# Patient Record
Sex: FEMALE | Race: WHITE | NOT HISPANIC OR LATINO | Employment: FULL TIME | URBAN - METROPOLITAN AREA
[De-identification: names, ages, dates, MRNs, and addresses within clinical notes are randomized per-mention and may not be internally consistent; named-entity substitution may affect disease eponyms.]

---

## 2020-05-05 ENCOUNTER — HOSPITAL ENCOUNTER (INPATIENT)
Facility: CLINIC | Age: 35
LOS: 2 days | Discharge: HOME HEALTH | End: 2020-05-07
Attending: OBSTETRICS & GYNECOLOGY | Admitting: ADVANCED PRACTICE MIDWIFE
Payer: COMMERCIAL

## 2020-05-05 ENCOUNTER — ANESTHESIA (OUTPATIENT)
Dept: OBGYN | Facility: CLINIC | Age: 35
End: 2020-05-05
Payer: COMMERCIAL

## 2020-05-05 ENCOUNTER — ANESTHESIA EVENT (OUTPATIENT)
Dept: OBGYN | Facility: CLINIC | Age: 35
End: 2020-05-05
Payer: COMMERCIAL

## 2020-05-05 DIAGNOSIS — Z98.891 STATUS POST CESAREAN SECTION: Primary | ICD-10-CM

## 2020-05-05 LAB
ABO + RH BLD: NORMAL
ABO + RH BLD: NORMAL
ALT SERPL W P-5'-P-CCNC: 16 U/L (ref 0–50)
AST SERPL W P-5'-P-CCNC: 45 U/L (ref 0–45)
BASOPHILS # BLD AUTO: 0 10E9/L (ref 0–0.2)
BASOPHILS NFR BLD AUTO: 0.1 %
BLD GP AB SCN SERPL QL: NORMAL
BLOOD BANK CMNT PATIENT-IMP: NORMAL
CREAT SERPL-MCNC: 0.8 MG/DL (ref 0.52–1.04)
DIFFERENTIAL METHOD BLD: ABNORMAL
EOSINOPHIL # BLD AUTO: 0 10E9/L (ref 0–0.7)
EOSINOPHIL NFR BLD AUTO: 0 %
ERYTHROCYTE [DISTWIDTH] IN BLOOD BY AUTOMATED COUNT: 13.2 % (ref 10–15)
ERYTHROCYTE [DISTWIDTH] IN BLOOD BY AUTOMATED COUNT: 13.2 % (ref 10–15)
GFR SERPL CREATININE-BSD FRML MDRD: >90 ML/MIN/{1.73_M2}
HCT VFR BLD AUTO: 33.5 % (ref 35–47)
HCT VFR BLD AUTO: 38 % (ref 35–47)
HGB BLD-MCNC: 11.8 G/DL (ref 11.7–15.7)
HGB BLD-MCNC: 13.2 G/DL (ref 11.7–15.7)
IMM GRANULOCYTES # BLD: 0.2 10E9/L (ref 0–0.4)
IMM GRANULOCYTES NFR BLD: 0.8 %
LYMPHOCYTES # BLD AUTO: 0.6 10E9/L (ref 0.8–5.3)
LYMPHOCYTES NFR BLD AUTO: 2.5 %
MCH RBC QN AUTO: 31.2 PG (ref 26.5–33)
MCH RBC QN AUTO: 31.4 PG (ref 26.5–33)
MCHC RBC AUTO-ENTMCNC: 34.7 G/DL (ref 31.5–36.5)
MCHC RBC AUTO-ENTMCNC: 35.2 G/DL (ref 31.5–36.5)
MCV RBC AUTO: 89 FL (ref 78–100)
MCV RBC AUTO: 90 FL (ref 78–100)
MONOCYTES # BLD AUTO: 1.2 10E9/L (ref 0–1.3)
MONOCYTES NFR BLD AUTO: 4.7 %
NEUTROPHILS # BLD AUTO: 23.1 10E9/L (ref 1.6–8.3)
NEUTROPHILS NFR BLD AUTO: 91.9 %
NRBC # BLD AUTO: 0 10*3/UL
NRBC BLD AUTO-RTO: 0 /100
PLATELET # BLD AUTO: 249 10E9/L (ref 150–450)
PLATELET # BLD AUTO: 294 10E9/L (ref 150–450)
RBC # BLD AUTO: 3.76 10E12/L (ref 3.8–5.2)
RBC # BLD AUTO: 4.23 10E12/L (ref 3.8–5.2)
SARS-COV-2 PCR COMMENT: NORMAL
SARS-COV-2 RNA SPEC QL NAA+PROBE: NEGATIVE
SARS-COV-2 RNA SPEC QL NAA+PROBE: NORMAL
SPECIMEN EXP DATE BLD: NORMAL
SPECIMEN SOURCE: NORMAL
SPECIMEN SOURCE: NORMAL
T PALLIDUM AB SER QL: NONREACTIVE
WBC # BLD AUTO: 23.7 10E9/L (ref 4–11)
WBC # BLD AUTO: 25.1 10E9/L (ref 4–11)

## 2020-05-05 PROCEDURE — 86850 RBC ANTIBODY SCREEN: CPT | Performed by: ADVANCED PRACTICE MIDWIFE

## 2020-05-05 PROCEDURE — 85025 COMPLETE CBC W/AUTO DIFF WBC: CPT | Performed by: ADVANCED PRACTICE MIDWIFE

## 2020-05-05 PROCEDURE — 36415 COLL VENOUS BLD VENIPUNCTURE: CPT | Performed by: STUDENT IN AN ORGANIZED HEALTH CARE EDUCATION/TRAINING PROGRAM

## 2020-05-05 PROCEDURE — 25000128 H RX IP 250 OP 636: Performed by: STUDENT IN AN ORGANIZED HEALTH CARE EDUCATION/TRAINING PROGRAM

## 2020-05-05 PROCEDURE — 37000008 ZZH ANESTHESIA TECHNICAL FEE, 1ST 30 MIN: Performed by: OBSTETRICS & GYNECOLOGY

## 2020-05-05 PROCEDURE — 27210794 ZZH OR GENERAL SUPPLY STERILE: Performed by: OBSTETRICS & GYNECOLOGY

## 2020-05-05 PROCEDURE — 12000001 ZZH R&B MED SURG/OB UMMC

## 2020-05-05 PROCEDURE — 36000059 ZZH SURGERY LEVEL 3 EA 15 ADDTL MIN UMMC: Performed by: OBSTETRICS & GYNECOLOGY

## 2020-05-05 PROCEDURE — 25800030 ZZH RX IP 258 OP 636: Performed by: STUDENT IN AN ORGANIZED HEALTH CARE EDUCATION/TRAINING PROGRAM

## 2020-05-05 PROCEDURE — 71000017 ZZH RECOVERY PHASE 1 LEVEL 3 EA ADDTL HR: Performed by: OBSTETRICS & GYNECOLOGY

## 2020-05-05 PROCEDURE — 84460 ALANINE AMINO (ALT) (SGPT): CPT | Performed by: STUDENT IN AN ORGANIZED HEALTH CARE EDUCATION/TRAINING PROGRAM

## 2020-05-05 PROCEDURE — 37000009 ZZH ANESTHESIA TECHNICAL FEE, EACH ADDTL 15 MIN: Performed by: OBSTETRICS & GYNECOLOGY

## 2020-05-05 PROCEDURE — 86780 TREPONEMA PALLIDUM: CPT | Performed by: ADVANCED PRACTICE MIDWIFE

## 2020-05-05 PROCEDURE — 25000125 ZZHC RX 250: Performed by: ADVANCED PRACTICE MIDWIFE

## 2020-05-05 PROCEDURE — 25000132 ZZH RX MED GY IP 250 OP 250 PS 637: Performed by: STUDENT IN AN ORGANIZED HEALTH CARE EDUCATION/TRAINING PROGRAM

## 2020-05-05 PROCEDURE — 87635 SARS-COV-2 COVID-19 AMP PRB: CPT | Performed by: ADVANCED PRACTICE MIDWIFE

## 2020-05-05 PROCEDURE — 25000565 ZZH ISOFLURANE, EA 15 MIN: Performed by: OBSTETRICS & GYNECOLOGY

## 2020-05-05 PROCEDURE — 25000125 ZZHC RX 250: Performed by: STUDENT IN AN ORGANIZED HEALTH CARE EDUCATION/TRAINING PROGRAM

## 2020-05-05 PROCEDURE — 86900 BLOOD TYPING SEROLOGIC ABO: CPT | Performed by: ADVANCED PRACTICE MIDWIFE

## 2020-05-05 PROCEDURE — C1765 ADHESION BARRIER: HCPCS | Performed by: OBSTETRICS & GYNECOLOGY

## 2020-05-05 PROCEDURE — 27110028 ZZH OR GENERAL SUPPLY NON-STERILE: Performed by: OBSTETRICS & GYNECOLOGY

## 2020-05-05 PROCEDURE — 85027 COMPLETE CBC AUTOMATED: CPT | Performed by: STUDENT IN AN ORGANIZED HEALTH CARE EDUCATION/TRAINING PROGRAM

## 2020-05-05 PROCEDURE — 71000016 ZZH RECOVERY PHASE 1 LEVEL 3 FIRST HR: Performed by: OBSTETRICS & GYNECOLOGY

## 2020-05-05 PROCEDURE — 84450 TRANSFERASE (AST) (SGOT): CPT | Performed by: STUDENT IN AN ORGANIZED HEALTH CARE EDUCATION/TRAINING PROGRAM

## 2020-05-05 PROCEDURE — 25000125 ZZHC RX 250: Performed by: OBSTETRICS & GYNECOLOGY

## 2020-05-05 PROCEDURE — 82565 ASSAY OF CREATININE: CPT | Performed by: STUDENT IN AN ORGANIZED HEALTH CARE EDUCATION/TRAINING PROGRAM

## 2020-05-05 PROCEDURE — 36000057 ZZH SURGERY LEVEL 3 1ST 30 MIN - UMMC: Performed by: OBSTETRICS & GYNECOLOGY

## 2020-05-05 PROCEDURE — 40000977 ZZH STATISTIC ATTENDANCE AT DELIVERY

## 2020-05-05 PROCEDURE — 86901 BLOOD TYPING SEROLOGIC RH(D): CPT | Performed by: ADVANCED PRACTICE MIDWIFE

## 2020-05-05 RX ORDER — AMOXICILLIN 250 MG
2 CAPSULE ORAL 2 TIMES DAILY
Status: DISCONTINUED | OUTPATIENT
Start: 2020-05-05 | End: 2020-05-07 | Stop reason: HOSPADM

## 2020-05-05 RX ORDER — PRENATAL VIT/IRON FUM/FOLIC AC 27MG-0.8MG
1 TABLET ORAL DAILY
COMMUNITY

## 2020-05-05 RX ORDER — HYDROMORPHONE HYDROCHLORIDE 1 MG/ML
.3-.5 INJECTION, SOLUTION INTRAMUSCULAR; INTRAVENOUS; SUBCUTANEOUS EVERY 10 MIN PRN
Status: DISCONTINUED | OUTPATIENT
Start: 2020-05-05 | End: 2020-05-05 | Stop reason: CLARIF

## 2020-05-05 RX ORDER — AZITHROMYCIN 500 MG/5ML
500 INJECTION, POWDER, LYOPHILIZED, FOR SOLUTION INTRAVENOUS ONCE
Status: COMPLETED | OUTPATIENT
Start: 2020-05-05 | End: 2020-05-05

## 2020-05-05 RX ORDER — NALOXONE HYDROCHLORIDE 0.4 MG/ML
.1-.4 INJECTION, SOLUTION INTRAMUSCULAR; INTRAVENOUS; SUBCUTANEOUS
Status: DISCONTINUED | OUTPATIENT
Start: 2020-05-05 | End: 2020-05-07 | Stop reason: HOSPADM

## 2020-05-05 RX ORDER — FENTANYL CITRATE 50 UG/ML
INJECTION, SOLUTION INTRAMUSCULAR; INTRAVENOUS PRN
Status: DISCONTINUED | OUTPATIENT
Start: 2020-05-05 | End: 2020-05-05

## 2020-05-05 RX ORDER — SIMETHICONE 80 MG
80 TABLET,CHEWABLE ORAL 4 TIMES DAILY PRN
Status: DISCONTINUED | OUTPATIENT
Start: 2020-05-05 | End: 2020-05-07 | Stop reason: HOSPADM

## 2020-05-05 RX ORDER — CETIRIZINE HYDROCHLORIDE 10 MG/1
10 TABLET ORAL DAILY
COMMUNITY

## 2020-05-05 RX ORDER — OXYCODONE HYDROCHLORIDE 5 MG/1
5 TABLET ORAL EVERY 4 HOURS PRN
Status: DISCONTINUED | OUTPATIENT
Start: 2020-05-05 | End: 2020-05-07 | Stop reason: HOSPADM

## 2020-05-05 RX ORDER — NALOXONE HYDROCHLORIDE 0.4 MG/ML
.1-.4 INJECTION, SOLUTION INTRAMUSCULAR; INTRAVENOUS; SUBCUTANEOUS
Status: DISCONTINUED | OUTPATIENT
Start: 2020-05-05 | End: 2020-05-05 | Stop reason: CLARIF

## 2020-05-05 RX ORDER — FLUMAZENIL 0.1 MG/ML
0.2 INJECTION, SOLUTION INTRAVENOUS
Status: DISCONTINUED | OUTPATIENT
Start: 2020-05-05 | End: 2020-05-05

## 2020-05-05 RX ORDER — NALOXONE HYDROCHLORIDE 0.4 MG/ML
.1-.4 INJECTION, SOLUTION INTRAMUSCULAR; INTRAVENOUS; SUBCUTANEOUS
Status: DISCONTINUED | OUTPATIENT
Start: 2020-05-05 | End: 2020-05-05

## 2020-05-05 RX ORDER — IBUPROFEN 800 MG/1
800 TABLET, FILM COATED ORAL EVERY 6 HOURS
Status: DISCONTINUED | OUTPATIENT
Start: 2020-05-06 | End: 2020-05-07 | Stop reason: HOSPADM

## 2020-05-05 RX ORDER — SODIUM CHLORIDE, SODIUM LACTATE, POTASSIUM CHLORIDE, CALCIUM CHLORIDE 600; 310; 30; 20 MG/100ML; MG/100ML; MG/100ML; MG/100ML
INJECTION, SOLUTION INTRAVENOUS CONTINUOUS PRN
Status: DISCONTINUED | OUTPATIENT
Start: 2020-05-05 | End: 2020-05-05

## 2020-05-05 RX ORDER — ACETAMINOPHEN 325 MG/1
975 TABLET ORAL EVERY 6 HOURS
Status: DISCONTINUED | OUTPATIENT
Start: 2020-05-05 | End: 2020-05-07 | Stop reason: HOSPADM

## 2020-05-05 RX ORDER — OXYTOCIN/0.9 % SODIUM CHLORIDE 30/500 ML
100 PLASTIC BAG, INJECTION (ML) INTRAVENOUS CONTINUOUS
Status: DISCONTINUED | OUTPATIENT
Start: 2020-05-05 | End: 2020-05-07 | Stop reason: HOSPADM

## 2020-05-05 RX ORDER — OXYTOCIN/0.9 % SODIUM CHLORIDE 30/500 ML
100-340 PLASTIC BAG, INJECTION (ML) INTRAVENOUS CONTINUOUS PRN
Status: COMPLETED | OUTPATIENT
Start: 2020-05-05 | End: 2020-05-05

## 2020-05-05 RX ORDER — LIDOCAINE 40 MG/G
CREAM TOPICAL
Status: DISCONTINUED | OUTPATIENT
Start: 2020-05-05 | End: 2020-05-07 | Stop reason: HOSPADM

## 2020-05-05 RX ORDER — CARBOPROST TROMETHAMINE 250 UG/ML
250 INJECTION, SOLUTION INTRAMUSCULAR
Status: DISCONTINUED | OUTPATIENT
Start: 2020-05-05 | End: 2020-05-07 | Stop reason: HOSPADM

## 2020-05-05 RX ORDER — ACETAMINOPHEN 325 MG/1
650 TABLET ORAL EVERY 4 HOURS PRN
Status: DISCONTINUED | OUTPATIENT
Start: 2020-05-05 | End: 2020-05-07 | Stop reason: HOSPADM

## 2020-05-05 RX ORDER — FENTANYL CITRATE 50 UG/ML
25-50 INJECTION, SOLUTION INTRAMUSCULAR; INTRAVENOUS
Status: DISCONTINUED | OUTPATIENT
Start: 2020-05-05 | End: 2020-05-05

## 2020-05-05 RX ORDER — CEFAZOLIN SODIUM 1 G/3ML
INJECTION, POWDER, FOR SOLUTION INTRAMUSCULAR; INTRAVENOUS PRN
Status: DISCONTINUED | OUTPATIENT
Start: 2020-05-05 | End: 2020-05-05

## 2020-05-05 RX ORDER — OXYTOCIN 10 [USP'U]/ML
10 INJECTION, SOLUTION INTRAMUSCULAR; INTRAVENOUS
Status: DISCONTINUED | OUTPATIENT
Start: 2020-05-05 | End: 2020-05-07 | Stop reason: HOSPADM

## 2020-05-05 RX ORDER — LIDOCAINE 40 MG/G
CREAM TOPICAL
Status: DISCONTINUED | OUTPATIENT
Start: 2020-05-05 | End: 2020-05-05

## 2020-05-05 RX ORDER — ONDANSETRON 2 MG/ML
4 INJECTION INTRAMUSCULAR; INTRAVENOUS EVERY 30 MIN PRN
Status: DISCONTINUED | OUTPATIENT
Start: 2020-05-05 | End: 2020-05-05 | Stop reason: CLARIF

## 2020-05-05 RX ORDER — KETOROLAC TROMETHAMINE 30 MG/ML
30 INJECTION, SOLUTION INTRAMUSCULAR; INTRAVENOUS EVERY 6 HOURS PRN
Status: DISCONTINUED | OUTPATIENT
Start: 2020-05-05 | End: 2020-05-07 | Stop reason: HOSPADM

## 2020-05-05 RX ORDER — ONDANSETRON 2 MG/ML
INJECTION INTRAMUSCULAR; INTRAVENOUS PRN
Status: DISCONTINUED | OUTPATIENT
Start: 2020-05-05 | End: 2020-05-05

## 2020-05-05 RX ORDER — IBUPROFEN 800 MG/1
800 TABLET, FILM COATED ORAL
Status: DISCONTINUED | OUTPATIENT
Start: 2020-05-05 | End: 2020-05-07 | Stop reason: HOSPADM

## 2020-05-05 RX ORDER — VITAMIN B COMPLEX
TABLET ORAL DAILY
COMMUNITY

## 2020-05-05 RX ORDER — BISACODYL 10 MG
10 SUPPOSITORY, RECTAL RECTAL DAILY PRN
Status: DISCONTINUED | OUTPATIENT
Start: 2020-05-07 | End: 2020-05-07 | Stop reason: HOSPADM

## 2020-05-05 RX ORDER — OXYCODONE AND ACETAMINOPHEN 5; 325 MG/1; MG/1
1 TABLET ORAL
Status: DISCONTINUED | OUTPATIENT
Start: 2020-05-05 | End: 2020-05-07 | Stop reason: HOSPADM

## 2020-05-05 RX ORDER — ONDANSETRON 4 MG/1
4 TABLET, ORALLY DISINTEGRATING ORAL EVERY 30 MIN PRN
Status: DISCONTINUED | OUTPATIENT
Start: 2020-05-05 | End: 2020-05-05 | Stop reason: CLARIF

## 2020-05-05 RX ORDER — SODIUM CHLORIDE, SODIUM LACTATE, POTASSIUM CHLORIDE, CALCIUM CHLORIDE 600; 310; 30; 20 MG/100ML; MG/100ML; MG/100ML; MG/100ML
INJECTION, SOLUTION INTRAVENOUS
Status: DISCONTINUED
Start: 2020-05-05 | End: 2020-05-05 | Stop reason: HOSPADM

## 2020-05-05 RX ORDER — MAGNESIUM HYDROXIDE 1200 MG/15ML
LIQUID ORAL PRN
Status: DISCONTINUED | OUTPATIENT
Start: 2020-05-05 | End: 2020-05-07 | Stop reason: HOSPADM

## 2020-05-05 RX ORDER — FENTANYL CITRATE 50 UG/ML
25-50 INJECTION, SOLUTION INTRAMUSCULAR; INTRAVENOUS
Status: DISCONTINUED | OUTPATIENT
Start: 2020-05-05 | End: 2020-05-05 | Stop reason: CLARIF

## 2020-05-05 RX ORDER — SODIUM CHLORIDE, SODIUM LACTATE, POTASSIUM CHLORIDE, CALCIUM CHLORIDE 600; 310; 30; 20 MG/100ML; MG/100ML; MG/100ML; MG/100ML
INJECTION, SOLUTION INTRAVENOUS CONTINUOUS
Status: DISCONTINUED | OUTPATIENT
Start: 2020-05-05 | End: 2020-05-05

## 2020-05-05 RX ORDER — ONDANSETRON 2 MG/ML
4 INJECTION INTRAMUSCULAR; INTRAVENOUS EVERY 6 HOURS PRN
Status: DISCONTINUED | OUTPATIENT
Start: 2020-05-05 | End: 2020-05-05

## 2020-05-05 RX ORDER — OXYTOCIN/0.9 % SODIUM CHLORIDE 30/500 ML
340 PLASTIC BAG, INJECTION (ML) INTRAVENOUS CONTINUOUS PRN
Status: DISCONTINUED | OUTPATIENT
Start: 2020-05-05 | End: 2020-05-07 | Stop reason: HOSPADM

## 2020-05-05 RX ORDER — FENTANYL CITRATE 50 UG/ML
25-50 INJECTION, SOLUTION INTRAMUSCULAR; INTRAVENOUS
Status: DISCONTINUED | OUTPATIENT
Start: 2020-05-05 | End: 2020-05-05 | Stop reason: HOSPADM

## 2020-05-05 RX ORDER — MEPERIDINE HYDROCHLORIDE 25 MG/ML
12.5 INJECTION INTRAMUSCULAR; INTRAVENOUS; SUBCUTANEOUS
Status: DISCONTINUED | OUTPATIENT
Start: 2020-05-05 | End: 2020-05-05 | Stop reason: CLARIF

## 2020-05-05 RX ORDER — OXYCODONE HYDROCHLORIDE 5 MG/1
5 TABLET ORAL EVERY 4 HOURS PRN
Status: DISCONTINUED | OUTPATIENT
Start: 2020-05-05 | End: 2020-05-05 | Stop reason: CLARIF

## 2020-05-05 RX ORDER — HYDROMORPHONE HYDROCHLORIDE 1 MG/ML
.3-.5 INJECTION, SOLUTION INTRAMUSCULAR; INTRAVENOUS; SUBCUTANEOUS EVERY 5 MIN PRN
Status: DISCONTINUED | OUTPATIENT
Start: 2020-05-05 | End: 2020-05-05 | Stop reason: HOSPADM

## 2020-05-05 RX ORDER — SODIUM CHLORIDE, SODIUM LACTATE, POTASSIUM CHLORIDE, CALCIUM CHLORIDE 600; 310; 30; 20 MG/100ML; MG/100ML; MG/100ML; MG/100ML
INJECTION, SOLUTION INTRAVENOUS CONTINUOUS
Status: DISCONTINUED | OUTPATIENT
Start: 2020-05-05 | End: 2020-05-07 | Stop reason: HOSPADM

## 2020-05-05 RX ORDER — AMOXICILLIN 250 MG
1 CAPSULE ORAL 2 TIMES DAILY
Status: DISCONTINUED | OUTPATIENT
Start: 2020-05-05 | End: 2020-05-07 | Stop reason: HOSPADM

## 2020-05-05 RX ORDER — DEXTROSE, SODIUM CHLORIDE, SODIUM LACTATE, POTASSIUM CHLORIDE, AND CALCIUM CHLORIDE 5; .6; .31; .03; .02 G/100ML; G/100ML; G/100ML; G/100ML; G/100ML
INJECTION, SOLUTION INTRAVENOUS CONTINUOUS
Status: DISCONTINUED | OUTPATIENT
Start: 2020-05-05 | End: 2020-05-07 | Stop reason: HOSPADM

## 2020-05-05 RX ORDER — HYDROCORTISONE 2.5 %
CREAM (GRAM) TOPICAL 3 TIMES DAILY PRN
Status: DISCONTINUED | OUTPATIENT
Start: 2020-05-05 | End: 2020-05-07 | Stop reason: HOSPADM

## 2020-05-05 RX ORDER — MODIFIED LANOLIN
OINTMENT (GRAM) TOPICAL
Status: DISCONTINUED | OUTPATIENT
Start: 2020-05-05 | End: 2020-05-07 | Stop reason: HOSPADM

## 2020-05-05 RX ORDER — ONDANSETRON 2 MG/ML
4 INJECTION INTRAMUSCULAR; INTRAVENOUS EVERY 30 MIN PRN
Status: DISCONTINUED | OUTPATIENT
Start: 2020-05-05 | End: 2020-05-05

## 2020-05-05 RX ORDER — ONDANSETRON 2 MG/ML
4 INJECTION INTRAMUSCULAR; INTRAVENOUS EVERY 6 HOURS PRN
Status: DISCONTINUED | OUTPATIENT
Start: 2020-05-05 | End: 2020-05-07 | Stop reason: HOSPADM

## 2020-05-05 RX ORDER — FENTANYL CITRATE 50 UG/ML
25-50 INJECTION, SOLUTION INTRAMUSCULAR; INTRAVENOUS EVERY 5 MIN PRN
Status: DISCONTINUED | OUTPATIENT
Start: 2020-05-05 | End: 2020-05-05 | Stop reason: HOSPADM

## 2020-05-05 RX ORDER — ONDANSETRON 4 MG/1
4 TABLET, ORALLY DISINTEGRATING ORAL EVERY 30 MIN PRN
Status: DISCONTINUED | OUTPATIENT
Start: 2020-05-05 | End: 2020-05-05

## 2020-05-05 RX ORDER — METHYLERGONOVINE MALEATE 0.2 MG/ML
200 INJECTION INTRAVENOUS
Status: DISCONTINUED | OUTPATIENT
Start: 2020-05-05 | End: 2020-05-07 | Stop reason: HOSPADM

## 2020-05-05 RX ORDER — PROPOFOL 10 MG/ML
INJECTION, EMULSION INTRAVENOUS PRN
Status: DISCONTINUED | OUTPATIENT
Start: 2020-05-05 | End: 2020-05-05

## 2020-05-05 RX ORDER — KETOROLAC TROMETHAMINE 30 MG/ML
30 INJECTION, SOLUTION INTRAMUSCULAR; INTRAVENOUS EVERY 6 HOURS
Status: COMPLETED | OUTPATIENT
Start: 2020-05-05 | End: 2020-05-06

## 2020-05-05 RX ADMIN — SENNOSIDES AND DOCUSATE SODIUM 1 TABLET: 8.6; 5 TABLET ORAL at 14:09

## 2020-05-05 RX ADMIN — KETOROLAC TROMETHAMINE 30 MG: 30 INJECTION, SOLUTION INTRAMUSCULAR at 14:10

## 2020-05-05 RX ADMIN — FENTANYL CITRATE 100 MCG: 50 INJECTION, SOLUTION INTRAMUSCULAR; INTRAVENOUS at 07:31

## 2020-05-05 RX ADMIN — FENTANYL CITRATE 100 MCG: 50 INJECTION, SOLUTION INTRAMUSCULAR; INTRAVENOUS at 06:25

## 2020-05-05 RX ADMIN — Medication 100 ML/HR: at 09:05

## 2020-05-05 RX ADMIN — SUGAMMADEX 200 MG: 100 INJECTION, SOLUTION INTRAVENOUS at 07:26

## 2020-05-05 RX ADMIN — MIDAZOLAM 2 MG: 1 INJECTION INTRAMUSCULAR; INTRAVENOUS at 06:25

## 2020-05-05 RX ADMIN — OXYCODONE HYDROCHLORIDE 5 MG: 5 TABLET ORAL at 20:18

## 2020-05-05 RX ADMIN — ACETAMINOPHEN 975 MG: 325 TABLET, FILM COATED ORAL at 14:10

## 2020-05-05 RX ADMIN — Medication 100 MG: at 06:22

## 2020-05-05 RX ADMIN — ACETAMINOPHEN 975 MG: 325 TABLET, FILM COATED ORAL at 20:18

## 2020-05-05 RX ADMIN — PROPOFOL 200 MG: 10 INJECTION, EMULSION INTRAVENOUS at 06:22

## 2020-05-05 RX ADMIN — ONDANSETRON 4 MG: 2 INJECTION INTRAMUSCULAR; INTRAVENOUS at 06:37

## 2020-05-05 RX ADMIN — OXYTOCIN-SODIUM CHLORIDE 0.9% IV SOLN 30 UNIT/500ML 600 ML/HR: 30-0.9/5 SOLUTION at 06:25

## 2020-05-05 RX ADMIN — SODIUM CHLORIDE, POTASSIUM CHLORIDE, SODIUM LACTATE AND CALCIUM CHLORIDE: 600; 310; 30; 20 INJECTION, SOLUTION INTRAVENOUS at 06:15

## 2020-05-05 RX ADMIN — SODIUM CHLORIDE, SODIUM LACTATE, POTASSIUM CHLORIDE, CALCIUM CHLORIDE AND DEXTROSE MONOHYDRATE: 5; 600; 310; 30; 20 INJECTION, SOLUTION INTRAVENOUS at 14:10

## 2020-05-05 RX ADMIN — HYDROMORPHONE HYDROCHLORIDE 0.5 MG: 1 INJECTION, SOLUTION INTRAMUSCULAR; INTRAVENOUS; SUBCUTANEOUS at 06:49

## 2020-05-05 RX ADMIN — ROCURONIUM BROMIDE 30 MG: 10 INJECTION INTRAVENOUS at 06:46

## 2020-05-05 RX ADMIN — PHENYLEPHRINE HYDROCHLORIDE 100 MCG: 10 INJECTION INTRAVENOUS at 07:44

## 2020-05-05 RX ADMIN — ACETAMINOPHEN 975 MG: 325 TABLET, FILM COATED ORAL at 08:58

## 2020-05-05 RX ADMIN — OXYCODONE HYDROCHLORIDE 5 MG: 5 TABLET ORAL at 11:03

## 2020-05-05 RX ADMIN — CEFAZOLIN 2 G: 1 INJECTION, POWDER, FOR SOLUTION INTRAMUSCULAR; INTRAVENOUS at 06:25

## 2020-05-05 RX ADMIN — OXYCODONE HYDROCHLORIDE 5 MG: 5 TABLET ORAL at 15:16

## 2020-05-05 RX ADMIN — KETOROLAC TROMETHAMINE 30 MG: 30 INJECTION, SOLUTION INTRAMUSCULAR at 20:19

## 2020-05-05 RX ADMIN — Medication 500 MG: at 07:16

## 2020-05-05 NOTE — ANESTHESIA POSTPROCEDURE EVALUATION
Anesthesia POST Procedure Evaluation    Patient: Heidi Alexander   MRN:     [id number] Gender:   female   Age:    35 year old :      1985        Preoperative Diagnosis: * No pre-op diagnosis entered *   Procedure(s):   SECTION   Postop Comments: No value filed.     Anesthesia Type: No value filed.          Postop Pain Control: Uneventful            Sign Out: Well controlled pain   PONV: No   Neuro/Psych: Uneventful            Sign Out: Acceptable/Baseline neuro status   Airway/Respiratory: Uneventful            Sign Out: Acceptable/Baseline resp. status   CV/Hemodynamics: Uneventful            Sign Out: Acceptable CV status   Other NRE: NONE   DID A NON-ROUTINE EVENT OCCUR? No         Last Anesthesia Record Vitals:  CRNA VITALS  2020 0729 - 2020 0829      2020             Pulse:  113    SpO2:  100 %          Last PACU Vitals:  Vitals Value Taken Time   /78 2020  9:05 AM   Temp     Pulse 86 2020  9:05 AM   Resp 9 2020  9:08 AM   SpO2 98 % 2020  9:08 AM   Temp src     NIBP     Pulse     SpO2     Resp     Temp     Ht Rate     Temp 2     Vitals shown include unvalidated device data.      Electronically Signed By: Annette Mireles MD, May 5, 2020, 9:08 AM

## 2020-05-05 NOTE — ANESTHESIA CARE TRANSFER NOTE
Patient: Heidi Alexander    Procedure(s):   SECTION    Diagnosis: * No pre-op diagnosis entered *  Diagnosis Additional Information: No value filed.    Anesthesia Type:   No value filed.     Note:  Airway :Face Mask  Patient transferred to:PACU  Comments: VSS. Breathing spontaneously at a regular rate with adequate tidal volumes and maintaining O2 sats on 6L. Denies nausea or pain. No apparent complications from anesthesia.     Gita Atwood MD [facility] [place] [facility] pager [phone]  Anesthesia CA-1  Handoff Report: Identifed the Patient, Identified the Reponsible Provider, Reviewed the pertinent medical history, Discussed the surgical course, Reviewed Intra-OP anesthesia mangement and issues during anesthesia, Set expectations for post-procedure period and Allowed opportunity for questions and acknowledgement of understanding      Vitals: (Last set prior to Anesthesia Care Transfer)    CRNA VITALS  2020 0729 - 2020 0813      2020             Pulse:  113    SpO2:  100 %                Electronically Signed By: Gita Atwood MD  May 5, 2020  8:13 AM

## 2020-05-05 NOTE — PLAN OF CARE
Care assumed as pt entered OB-OR 1 at 0613. Pt had arrived from [facility] after prolonged labor, arrest of descent and non-reassuring fetal tracing. Pt gave verbal consent for Code C/section.  Under general anesthesia pt delivered baby girl at 0623 with NICU Team attending to baby.  After extubation and 18 minute wait time for Coved Precautions pt was transferred, awake to OB/PACU where spouse, Brad and baby were waiting. After 2 hour stable recovery pt transferred to Post-partum.    Data: Heidi Alexander transferred to [facility] via cart at 1000. Baby transferred via the patient's arms.  Action: Receiving unit notified of transfer: Yes. Patient and family notified of room change. Report given to LICHA Hernandez at 0945. Belongings sent to receiving unit. Accompanied by Registered Nurse. Oriented patient to surroundings. Call light within reach. ID bands double-checked with receiving RN.  Response: Patient tolerated transfer and is stable.

## 2020-05-05 NOTE — H&P
"ADMIT NOTE  =================  39+6    Heidi Alexander is a 35 year old female with an LMP 20 and Estimated Date of Delivery: 2020 is [facility] to the [facility] on 2020 at 0540. MILA from [facility]- 2nd stage no progress.     HPI  ================  Received phone call from [facility] midwife from [facility] Mirella- at approximately 0445. Per midwife, pt was laboring for 24 hours at [facility]. Was 4cm at 1300. Per midwife, pt complete dilation and pushing starting at 0300. Received IV zofran and IV fluid bolus.     Midwife recommended transfer to [facility] d/t minimal progress made with pushing, possible OP presentation and pt request for intervention.    Patient with planned Out of Hospital Birth is transferring by car to the hospital for : arrest of descent  Patient has been seen by [facility] for prenatal care.  Transferring midwife name(s),/birth center & phone number: Mirella [phone]  Midwife here supporting patient: no d/t COVID  Records received, reviewed and scanned into chart. yes    Contractions- strong  Fetal movement- active  ROM- yes, no fluid seen  Vaginal bleeding- none  GBS- negative  FOB- is involved, Brad- present    Weight gain- 204 - 173 lbs, Total weight gain- 31 lbs  Height- 5\"  BMI- 32.6  Intital prenatal care at [facility] at 8 weeks, 3 visit. MILA to [facility] at 18 weeks, 10 visits    OTHER:  - obesity 32.6  - PCOS  - chronic headaches  - history of high blood pressure reported    PROBLEM LIST  =================  Patient Active Problem List    Diagnosis Date Noted     Labor and delivery indication for care or intervention 2020     Priority: Medium     Status post  section 2020     Priority: Medium     Flat feet 2012     Priority: Medium     Hypertriglyceridemia 2012     Priority: Medium     Obesity (BMI 30-39.9) 2012     Priority: Medium     Pilonidal cyst-s/p surgery x2 2012     Priority: Medium     CARDIOVASCULAR SCREENING; LDL GOAL " "LESS THAN 160 2012     Priority: Medium     Lipoma of skin and subcutaneous tissue-upper back 2012     Priority: Medium     Bunion of great toe of right foot 2012     Priority: Medium       HISTORIES  ============  Allergies   Allergen Reactions     Adhesive Tape Rash     Past Medical History:   Diagnosis Date     Polycystic ovarian syndrome      Past Surgical History:   Procedure Laterality Date     BUNIONECTOMY Right 2016      SECTION N/A 2020    Procedure:  SECTION;  Surgeon: Katheryn Potter MD;  Location: [facility] L+D   .  History reviewed. No pertinent family history.  Social History     Tobacco Use     Smoking status: Never Smoker     Smokeless tobacco: Never Used   Substance Use Topics     Alcohol use: Not Currently     OB History    Para Term  AB Living   1 1 1 0 0 1   SAB TAB Ectopic Multiple Live Births   0 0 0 0 1      # Outcome Date GA Lbr Soren/2nd Weight Sex Delivery Anes PTL Lv   1 Term 20 40w1d  3.204 kg (7 lb 1 oz) F CS-LTranv   MARC      Complications: Fetal Intolerance      Name: LIZETTE GARCÍA      Apgar1: 8  Apgar5: 9        LABS:   ===========  Prenatal Labs reviewed per transfer records: [place]   Rhogam not indicated  ABO/ RH: A positive, negative antibody screen   Rubella immune   HBsAg: negative   HIV: negative   RPR: NR   GCT: date20, result 108  GBS: date 20, result negative  OTHER:   HgbA1C: 4.5%  Pap: Reported- 2018, normal       Lab Results   Component Value Date    ABO A 2020    RH Pos 2020    AS Neg 2020    HGB 10.4 (L) 2020     No results found for: GBS  Other labs:  No results found for this or any previous visit (from the past 24 hour(s)).    ULTRASOUND  =============  Date 19, result  \"Limited views four-chamber heart, cannot exclude VSD, otherwise normal.\"     ROS  =========  Pt denies significant respiratory, cardiovacular, GI, or muscular/skeletalcomplaints.    See RN data base " ROS.     PHYSICAL EXAM:  ===============  /84   Pulse 99   Temp 98.3  F (36.8  C) (Oral)   Resp 18   LMP 07/14/2019   SpO2 96%   Breastfeeding Unknown   General appearance: uncomfortable with contractions  GENERAL APPEARANCE: healthy, alert and no distress  RESP: lungs clear to auscultation - no rales, rhonchi or wheezes  BREAST: normal without masses, tenderness or nipple discharge and no palpable axillary masses or adenopathy  CV: regular rates and rhythm, normal S1 S2, no S3 or S4 and no murmur,and no varicosities  ABDOMEN:  soft, nontender, no epigastric pain  SKIN: no suspicious lesions or rashes  NEURO: Denies headache, blurred vision, other vision changes  PSYCH: mentation appears normal. and affect normal/bright  Legs: Reflexes normal bilaterally     Abdomen: gravid, vertex fetus per Leopold's, non-tender between contractions.   Cephalic presentation confirmed by BSUS, direct OP  EFW-  7.5 lbs.   CONTRACTIONS: every 2-5 minutes  FETAL HEART TONES: continuous EFM- difficult to hear with external monitor- initially 120s at 0545 with deceleration to 110s.   FSE placed - deceleration 80s-90s with return to baseline 120s between contractions.  PELVIC EXAM: 10/100/0, caput, OP  BLOODY SHOW: no   ROM:yes, no fluid seen; report was that fluid is clear  FLUID: none  ROMPLUS: not done      ASSESSMENT:  ==============  IUP @ 39+6 admitted    Second stage, OP, arrest of descent  Category 2 fetal tracing     PLAN:  ===========  Admit - see IP orders.  Admit labs ordered- abo/rh t&s, cbc with plts, anti trep  IV is in place from [facility].  Ultrasound performed- direct OP.  Exam performed while attempting to place EFM- 10/100/0, caput, OP.  EFM 120s with possible decelerations. Difficulty tracing d/t maternal positioning and movement.  Consent given for FSE placement.  FSE placed and FHR decelerations to 80s-90s with each contraction. Not improving with position changes.  Dr. Rogel and Dr. Potter called to  bedside.  Recommended c/s for category 2 fetal tracing and arrest of descent.   Consent given from patient and .  STAT c/s called. Care transferred to OB MD team.    JERRY Godfrey, KINGSLEYM

## 2020-05-05 NOTE — PLAN OF CARE
1030:  Patient arrived to [facility] unit via cart, with belongings, accompanied by spouse, with infant.  Received report from LICHA Temple and checked bands x3. Unit and room orientation  done. Call light within reach.  Phone in reach.  No concerns present at this time. Continue with plan of care.      1530:  Patient has been medicated for discomfort with scheduled Tylenol and Ketorlac.  She has also wished to use the Oxycodone, and did wish to repeat it when available.  She did say that the Oxycodone was effective in helping to reduce her aches.  Spouse has remained with patient, and has been supportive, as well as hands-on with the infant.

## 2020-05-05 NOTE — ANESTHESIA POSTPROCEDURE EVALUATION
Anesthesia POST Procedure Evaluation    Patient: Heidi Alexander   MRN:     [id number] Gender:   female   Age:    35 year old :      1985        Preoperative Diagnosis: * No pre-op diagnosis entered *   Procedure(s):   SECTION   Postop Comments: No value filed.     Anesthesia Type: No value filed.          Postop Pain Control: Uneventful            Sign Out: Well controlled pain   PONV: No   Neuro/Psych: Uneventful            Sign Out: Acceptable/Baseline neuro status   Airway/Respiratory: Uneventful            Sign Out: Acceptable/Baseline resp. status   CV/Hemodynamics: Uneventful            Sign Out: Acceptable CV status   Other NRE: NONE   DID A NON-ROUTINE EVENT OCCUR? No         Last Anesthesia Record Vitals:  CRNA VITALS  2020 0729 - 2020 0829      2020             Pulse:  113    SpO2:  100 %          Last PACU Vitals:  Vitals Value Taken Time   /68 2020 12:00 PM   Temp 37.1  C (98.7  F) 2020 10:33 AM   Pulse 88 2020 10:04 AM   Resp 16 2020 11:45 AM   SpO2 97 % 2020 12:00 PM   Temp src     NIBP     Pulse     SpO2     Resp     Temp     Ht Rate     Temp 2     Vitals shown include unvalidated device data.      Electronically Signed By: Annette Mireles MD, May 5, 2020, 3:51 PM

## 2020-05-05 NOTE — DISCHARGE SUMMARY
[facility] Discharge Summary    Heidi Alexander MRN# [id number]   Age: 35 year old YOB: 1985     Date of Admission:  2020  Date of Discharge::  2020   Admitting Physician:  Annalise Weber MD  Discharge Physician:  Aurelia Muse MD             Admission Diagnoses:   Intrauterine pregnancy at 39w5d  Category II FHT remote from delivery  PCOS  Anxiety          Discharge Diagnosis:     Same, delivered   Fetal bradycardia requiring emergent  delivery          Procedures:     Procedure(s): Primary low transverse  section with double layer closure via Pfannenstiel skin incision  GETA  TAP block                 Medications Prior to Admission:     Medications Prior to Admission   Medication Sig Dispense Refill Last Dose     Albuterol Sulfate (VENTOLIN HFA) 108 (90 BASE) MCG/ACT AERS Inhale  into the lungs.        Ascorbic Acid (VITAMIN C) 500 MG CHEW    Past Week at Unknown time     cetirizine (ZYRTEC) 10 MG tablet Take 10 mg by mouth daily   Past Week at Unknown time     Prenatal Vit-Fe Fumarate-FA (PRENATAL MULTIVITAMIN W/IRON) 27-0.8 MG tablet Take 1 tablet by mouth daily   Past Week at Unknown time     Vitamin D3 (CHOLECALCIFEROL) 25 mcg (1000 units) tablet Take by mouth daily   Past Week at Unknown time     PROGESTERONE 100MG CAPSULES COMPOUND Take 100 capsules by mouth daily                Discharge Medications:        Review of your medicines      START taking      Dose / Directions   acetaminophen 325 MG tablet  Commonly known as:  TYLENOL      Dose:  650 mg  Take 2 tablets (650 mg) by mouth every 6 hours as needed for mild pain Start after Delivery.  Quantity:  100 tablet  Refills:  0     ibuprofen 600 MG tablet  Commonly known as:  ADVIL/MOTRIN      Dose:  600 mg  Take 1 tablet (600 mg) by mouth every 6 hours as needed for moderate pain Start after delivery  Quantity:  60 tablet  Refills:  0     oxyCODONE 5 MG tablet  Commonly known as:  ROXICODONE      Dose:  5  mg  Take 1 tablet (5 mg) by mouth every 6 hours as needed for pain  Quantity:  12 tablet  Refills:  0     senna-docusate 8.6-50 MG tablet  Commonly known as:  SENOKOT-S/PERICOLACE      Dose:  1 tablet  Take 1 tablet by mouth daily Start after delivery.  Quantity:  100 tablet  Refills:  0        CONTINUE these medicines which have NOT CHANGED      Dose / Directions   cetirizine 10 MG tablet  Commonly known as:  zyrTEC  Indication:  Hayfever      Dose:  10 mg  Take 10 mg by mouth daily  Refills:  0     prenatal multivitamin w/iron 27-0.8 MG tablet  Indication:  Pregnancy      Dose:  1 tablet  Take 1 tablet by mouth daily  Refills:  0     PROGESTERONE 100MG CAPSULES COMPOUND      Dose:  100 capsule  Take 100 capsules by mouth daily  Refills:  0     Ventolin  (90 Base) MCG/ACT inhaler  Generic drug:  albuterol      Inhale  into the lungs.  Refills:  0     vitamin C 500 MG Chew      Refills:  0     Vitamin D3 25 mcg (1000 units) tablet  Commonly known as:  CHOLECALCIFEROL      Take by mouth daily  Refills:  0           Where to get your medicines      These medications were sent to [facility] [address]  [address] [address]    Phone:  [phone]     acetaminophen 325 MG tablet    ibuprofen 600 MG tablet    senna-docusate 8.6-50 MG tablet     Some of these will need a paper prescription and others can be bought over the counter. Ask your nurse if you have questions.    Bring a paper prescription for each of these medications    oxyCODONE 5 MG tablet                Consultations:   none          Brief Admission History:   Heidi Alexander is a 35 year old  at 39w6d admitted as transfer from [facility].  There she had been completely dilated for 3 hours prior to arrival and had been pushing for one hour. She was found to be occiput posterior on arrival and having a Category II FHT with persistent decelerations to 80 bpm every 2-3  minutes despite changes in maternal positioning. Because of persistent Category II FHT, a STAT  section was recommended.   The risks, benefits, and alternatives of  section were discussed with the patient, and she agreed to proceed.        Intraoperative course   The procedure was uncomplicated.   mL.  See operative report for details.     1. Thick-meconium once entered into hysterotomy. No amniotic fluid  2. Liveborn female infant in OP presentation. Apgars 8 at 1 minute & 9 at 5 minutes. Weight 3204. Occiput posterior position.  3. Arterial cord pH 7.25, base deficit 8.5. Venous cord pH 7.28, base deficit 5.0.  4. Extension of hysterotomy on right aspect requiring oversewing with 0 Monocryl  5. Normal uterus, fallopian tubes, and ovaries.        Postpartum Course   The patient's hospital course was unremarkable.  She recovered as anticipated and experienced no post-operative complications. On discharge, her pain was well controlled. Vaginal bleeding is similar to peak menstrual flow.  Voiding without difficulty.  Ambulating well and tolerating a normal diet.  No fever or significant wound drainage.  Breastfeeding well.  Infant is stable.  No bowel movement yet.  She was discharged on post-partum day #2.    Post-partum hemoglobin:   Hemoglobin   Date Value Ref Range Status   2020 10.4 (L) 11.7 - 15.7 g/dL Final             Discharge Instructions and Follow-Up:     Discharge diet: Regular   Discharge activity: No lifting greater than 20 lbs, pushing, pulling, or other strenuous activity for 6 weeks. Pelvic rest for 6 weeks including no sexual intercourse, tampons, or douching. No driving until you can slam on the breaks without pain or while on narcotic pain medications.    Discharge follow-up: Follow up with primary OB for routine postpartum visit in 6 weeks   Wound care: Keep incision clean and dry           Discharge Disposition:     Discharged to home      Lulu Mclean  MD  Obstetrics and Gynecology, PGY-3  May 7, 2020 , 8:05 AM      I have seen, examined, and counseled the patient on the day of discharge. I have reviewed and edited the summary.  Aurelia Muse

## 2020-05-05 NOTE — ANESTHESIA PREPROCEDURE EVALUATION
Anesthesia Pre-Procedure Evaluation    Patient: Heidi Alexander   MRN:     [id number] Gender:   female   Age:    35 year old :      1985        Preoperative Diagnosis: * No pre-op diagnosis entered *   Procedure(s):   SECTION     LABS:  CBC:   Lab Results   Component Value Date    WBC 25.1 (H) 2020    WBC 4.3 2005    HGB 13.2 2020    HGB 13.4 2005    HCT 38.0 2020    HCT 38.3 2005     2020     2005     BMP:   Lab Results   Component Value Date     10/10/2014     2012    POTASSIUM 3.4 10/10/2014    POTASSIUM 3.8 2012    CHLORIDE 105 10/10/2014    CHLORIDE 104 2012    CO2 25 10/10/2014    CO2 23 2012    BUN 8 10/10/2014    BUN 8 2012    CR 0.71 10/10/2014    CR 0.71 2012     (H) 10/10/2014    GLC 83 2012     COAGS:   Lab Results   Component Value Date    PTT 34 2005    INR 1.14 2005     POC:   Lab Results   Component Value Date    HCG neg 10/10/2014     OTHER:   Lab Results   Component Value Date    BETSEY 9.4 10/10/2014    ALBUMIN 4.0 2005    PROTTOTAL 7.8 2005    ALT 35 2005    AST 56 (H) 2005    ALKPHOS 77 2005    BILITOTAL 0.5 2005    TSH 0.95 2012    SED 33 (H) 2005        Preop Vitals    BP Readings from Last 3 Encounters:   10/10/14 136/90   12 127/82   12 143/93    Pulse Readings from Last 3 Encounters:   12 90   12 93   12 96      Resp Readings from Last 3 Encounters:   10/10/14 16   12 16    SpO2 Readings from Last 3 Encounters:   10/10/14 99%   12 97%   12 98%      Temp Readings from Last 1 Encounters:   10/10/14 36.8  C (98.3  F) (Oral)    Ht Readings from Last 1 Encounters:   10/10/14 1.524 m (5')      Wt Readings from Last 1 Encounters:   10/10/14 81.6 kg (180 lb)    Estimated body mass index is 35.15 kg/m  as calculated from the following:    Height as of  10/10/14: 1.524 m (5').    Weight as of 10/10/14: 81.6 kg (180 lb).     LDA:  ETT 7 (Active)   Number of days: 0        Past Medical History:   Diagnosis Date     Polycystic ovarian syndrome       No past surgical history on file.   Allergies   Allergen Reactions     Adhesive Tape Rash             JZG FV AN PHYSICAL EXAM    Assessment:   ASA SCORE: 3 emergent   H&P: History and physical reviewed and following examination; no interval change.         Plan:   Anes. Type:  Peripheral Nerve Block; For Post-op pain in coordination with surgeon; General     Block Details: TAP; Exparel; Single Shot   Pre-Medication: None   Induction:  IV (RSI)   Airway: ETT; Oral   Access/Monitoring: PIV   Maintenance: Balanced     Postop Plan:   Postop Pain: Regional  Postop Sedation/Airway: Not planned  Disposition: Inpatient/Admit     PONV Management: Adult Risk Factors: Female   Prevention: Ondansetron     CONSENT: Direct conversation   Plan and risks discussed with: Patient          Comments for Plan/Consent:  Code c/s  Consent spouse as able for tap block                 Rico Woodall MD

## 2020-05-05 NOTE — PROVIDER NOTIFICATION
05/05/20 0945   Provider Notification   Provider Name/Title Dr Gonzalez   Method of Notification Electronic Page   Request Evaluate-Remote   Notification Reason Lab Results;Other   Dr notified re: Sepsis alert that appeared on Epic chart. No further orders received.

## 2020-05-05 NOTE — OP NOTE
[facility]  Full Operative Progress Note     Surgery Date:  2020    Surgeon:  Katheryn Potter MD    Assistants:  Jael Rogel MD, PGY-2       Pre-op Diagnosis:    Intrauterine pregnancy at unknown gestation, near term  Anxiety    Post-op Diagnosis:    Same   Liveborn female infant     Procedure:  Primary low-transverse  section with double layer uterine closure via Pfannenstiel incision    Anesthesia: GETA    EBL:  605 cc    IVF:  1300 mL crystalloid    UOP:  25 mL clear urine at the end of the case    Drains: Neely Catheter     Specimens:  Cord blood, cord segment    Complications: None apparent    Indications:   Heidi Alexander is a 35 year old  at 39w6d admitted as transfer from [facility].  There she had been completely dilated for 3 hours prior to arrival and had been pushing for one hour. She was found to be occiput posterior on arrival and having a Category II FHT with persistent decelerations to 80 bpm every 2-3 minutes despite changes in maternal positioning. Because of persistent Category II FHT, a STAT  section was recommended.   The risks, benefits, and alternatives of  section were discussed with the patient, and she agreed to proceed.     Findings:   1. Thick-meconium noted once hysterotomy made. No amniotic fluid  2. Liveborn female infant in OP presentation. Apgars 8 at 1 minute & 9 at 5 minutes. Weight 3204. Occiput posterior position.  3. Arterial cord pH 7.25, base deficit 8.5. Venous cord pH 7.28, base deficit 5.0.  4. Extension of hysterotomy on right aspect requiring oversewing with 0 Monocryl  5. Normal uterus, fallopian tubes, and ovaries.     Procedure Details:   The patient was brought to the OR, where GETA anesthesia was administered.  She was placed in the dorsal supine position with a slight leftward tilt. She was prepped and draped in the usual STAT fashion using betadine. A pfannenstiel skin incision was made with  the scalpel, and carried down to the underlying fascia with sharp dissection. The fascia was incised in the midline, and the incision was extended laterally with blunt dissection. The rectus muscles were  in the midline, and the peritoneum was entered bluntly, and the opening was extended with digital pressure. A transverse hysterotomy was made with the scalpel in the lower uterine segment, and the incision was extended with digital pressure. The infant was noted to be in the occiput posterior position, and was delivered atraumatically. The shoulders delivered easily.  No nuchal cord was noted. The cord was doubly clamped and cut immediately , and the infant was handed off to the awaiting NICU staff. A segment of cord was cut and sent for gases. The placenta was delivered with gentle traction on the umbilical cord and uterine massage. The uterus was exteriorized and cleared of all clots and debris. Uterine tone was noted to be firm with 20 units of pitocin given through the running IV and uterine massage.  The hysterotomy was closed with a running locked suture of 0 Vicryl.  Extension on right aspect of hysterotomy was noted and was sewn with running locked 0  Monocryl.  The hysterotomy was then imbricated using an 0 Monocryl suture. The hysterotomy was noted to be hemostatic. The posterior cul-de-sac was irrigated and cleared of all clots and debris. The uterus was returned to the abdomen. The pericolic gutters were irrigated and cleared of all clots and debris. The hysterotomy was reexamined and noted to be hemostatic. The fascia and rectus muscles were examined and areas of oozing were controlled with electrocautery.The fascia was closed with a running 0 Vicryl suture. The subcutaneous tissue was irrigated and areas of oozing were controlled with electrocautery. The subcutaneous tissue was more than 2 cm in thickness, and was therefore closed. The skin was closed with 4-0 Monocryl and covered with a  sterile dressing.    All sponge, needle, and instrument counts were correct. The patient tolerated the procedure well, and was transferred to recovery in stable condition. Dr. Potter was present and scrubbed for the entirety of the procedure.     Jael Rogel MD  OBGYN PGY-2  7:52 AM 5/5/2020    I was present and scrubbed throughout the procedure,  I agree with the note above  Katheryn Potter MD

## 2020-05-06 LAB
HGB BLD-MCNC: 10.4 G/DL (ref 11.7–15.7)
T PALLIDUM AB SER QL: NONREACTIVE

## 2020-05-06 PROCEDURE — 86780 TREPONEMA PALLIDUM: CPT | Performed by: STUDENT IN AN ORGANIZED HEALTH CARE EDUCATION/TRAINING PROGRAM

## 2020-05-06 PROCEDURE — 25000128 H RX IP 250 OP 636: Performed by: STUDENT IN AN ORGANIZED HEALTH CARE EDUCATION/TRAINING PROGRAM

## 2020-05-06 PROCEDURE — 25000132 ZZH RX MED GY IP 250 OP 250 PS 637: Performed by: STUDENT IN AN ORGANIZED HEALTH CARE EDUCATION/TRAINING PROGRAM

## 2020-05-06 PROCEDURE — 85018 HEMOGLOBIN: CPT | Performed by: STUDENT IN AN ORGANIZED HEALTH CARE EDUCATION/TRAINING PROGRAM

## 2020-05-06 PROCEDURE — 25000132 ZZH RX MED GY IP 250 OP 250 PS 637: Performed by: OBSTETRICS & GYNECOLOGY

## 2020-05-06 PROCEDURE — 36415 COLL VENOUS BLD VENIPUNCTURE: CPT | Performed by: STUDENT IN AN ORGANIZED HEALTH CARE EDUCATION/TRAINING PROGRAM

## 2020-05-06 PROCEDURE — 12000001 ZZH R&B MED SURG/OB UMMC

## 2020-05-06 RX ORDER — FLUTICASONE PROPIONATE 50 MCG
1 SPRAY, SUSPENSION (ML) NASAL DAILY
Status: DISCONTINUED | OUTPATIENT
Start: 2020-05-06 | End: 2020-05-07 | Stop reason: HOSPADM

## 2020-05-06 RX ORDER — CETIRIZINE HYDROCHLORIDE 5 MG/1
5 TABLET ORAL DAILY
Status: DISCONTINUED | OUTPATIENT
Start: 2020-05-06 | End: 2020-05-07 | Stop reason: HOSPADM

## 2020-05-06 RX ADMIN — OXYCODONE HYDROCHLORIDE 5 MG: 5 TABLET ORAL at 16:27

## 2020-05-06 RX ADMIN — CETIRIZINE HYDROCHLORIDE 5 MG: 5 TABLET ORAL at 12:28

## 2020-05-06 RX ADMIN — IBUPROFEN 800 MG: 800 TABLET, FILM COATED ORAL at 08:47

## 2020-05-06 RX ADMIN — ACETAMINOPHEN 975 MG: 325 TABLET, FILM COATED ORAL at 20:54

## 2020-05-06 RX ADMIN — OXYCODONE HYDROCHLORIDE 5 MG: 5 TABLET ORAL at 06:33

## 2020-05-06 RX ADMIN — FLUTICASONE PROPIONATE 1 SPRAY: 50 SPRAY, METERED NASAL at 12:29

## 2020-05-06 RX ADMIN — IBUPROFEN 800 MG: 800 TABLET, FILM COATED ORAL at 20:54

## 2020-05-06 RX ADMIN — SENNOSIDES AND DOCUSATE SODIUM 1 TABLET: 8.6; 5 TABLET ORAL at 08:46

## 2020-05-06 RX ADMIN — SENNOSIDES AND DOCUSATE SODIUM 2 TABLET: 8.6; 5 TABLET ORAL at 20:54

## 2020-05-06 RX ADMIN — ACETAMINOPHEN 975 MG: 325 TABLET, FILM COATED ORAL at 08:46

## 2020-05-06 RX ADMIN — KETOROLAC TROMETHAMINE 30 MG: 30 INJECTION, SOLUTION INTRAMUSCULAR at 02:31

## 2020-05-06 RX ADMIN — ACETAMINOPHEN 975 MG: 325 TABLET, FILM COATED ORAL at 02:38

## 2020-05-06 RX ADMIN — OXYCODONE HYDROCHLORIDE 5 MG: 5 TABLET ORAL at 21:11

## 2020-05-06 RX ADMIN — ACETAMINOPHEN 975 MG: 325 TABLET, FILM COATED ORAL at 14:52

## 2020-05-06 RX ADMIN — OXYCODONE HYDROCHLORIDE 5 MG: 5 TABLET ORAL at 10:31

## 2020-05-06 RX ADMIN — OXYCODONE HYDROCHLORIDE 5 MG: 5 TABLET ORAL at 01:13

## 2020-05-06 RX ADMIN — IBUPROFEN 800 MG: 800 TABLET, FILM COATED ORAL at 14:52

## 2020-05-06 NOTE — PLAN OF CARE
Patient's vss, postpartum assessment is WDL. Bonding well with infant. Patient appears exhausted this evening. Encouraged rest while infant is sleeping. Patient's pain is well managed. Will continue with plan of care.

## 2020-05-06 NOTE — LACTATION NOTE
This note was copied from a baby's chart.  Consult for: First time breastfeeding. I was able to work with family yesterday as Resource RN and checked in again today.     Maternal Assessment: Heidi transferred from [facility] and delivered under general anesthesia for code c/s on 5/5/20 at 0624.   Heidi has a history of obesity and PCOS. Heidi noted breast growth in early pregnancy and she has been able to hand express drops of colostrum since delivery. Her breasts appear asymmetrical (left larger than right) and tubular in shape. She has little tissue development on the underside of the breast. She has sore nipples but is working on getting a deeper latch.     Infant Assessment: Infant was born at 40.2 weeks and was AGA at birth. Her 24 hour weight loss was -3.7% of her birthweight at 6lb 12.8 oz. She has age appropriate output.     Infant has bruised head. She has a very thick, posterior lingual frenulum and mildly recessed chin. She is able to get her tongue down and covering her lower gum ridge but does make a chomping motion when sucking on my finger.     Feeding History: Infant has been waking regularly to breastfeed. Heidi has some mild pain and nipple tenderness. We worked yesterday on achieving a deep, asymmetric latch and she has been working on that again today with her bedside RN. Encouraged positioning that maximizes comfort (laid back or football position).     Heidi has been using breast massage and attempting hand expression of colostrum after each feeding to promote breast stimulation. She will initiate pumping this afternoon due to risk factors (code c/s, potential IGT).    Education: breastfeeding positions, benefits of hand expression and pumping, inpatient breastfeeding support and outpatient lactation support.    Plan: Continue breastfeeding on cue with RN support as needed with a goal of 8-12 feedings per day. Encourage frequent skin to skin. Continue breast massage and hand  expression with each feeding attempt and pumping every other feeding.    Heidi has a breast pump to use at home.    After discharge Heidi will receive breastfeeding support through her [facility].

## 2020-05-06 NOTE — PLAN OF CARE
Postpartum stable. Tolerating diet. Neely is patent and draining adequate amounts. PIV SL. Pain is tolerable with toradol, oxy and tylenol. Ambulated out of bed to the bathroom. Pain increased this evening and will hold off on removing Neely until better controlled. Right nipple has a blister, using lanolin cream. Incision is c/d/I. Spouse in room for support. Plan to shower after 24 hours and will let us know if she is interested in a taps block.

## 2020-05-06 NOTE — PLAN OF CARE
VSS. Fundus firm, midline at U/U. Lochia rubra and light, one small quarter sized clot observed otherwise bleeding controlled. Ambulated twice to bathroom overnight with abdominal binder and tolerated. Pt very sore all over - heating pack in use. Tylenol, ibuprofen and oxycodone given for pain. Incision open to air with steri strips - no signs of infection. Neely removed at 0630 - due to void at 1130. Up in chair for breakfast this AM. Breastfeeding with minimal assist but having sore and red nipples. Hydrogel given, lactation released per pt request. Passing lots of gas. Encouraging ambulation to promote further flatus. Bonding well with infant.  at bedside and very supportive. Continue current plan of care.

## 2020-05-06 NOTE — PROGRESS NOTES
[facility] Obstetrics Postpartum Progress Note    S: Patient states she is doing well.  Pain well controlled with current pain meds. Lochia light.  Eating and drinking without nausea/vomiting.  She is passing flatus.   Ambulating without difficulty.  Denies fevers/chills, dizziness, CP, SOB.  Breastfeeding.  Requests to speak to someone who was at .  Requests TAP block as preventive.  O:  Patient Vitals for the past 12 hrs:   BP Temp Temp src Pulse Resp SpO2   20 0847 118/68 97.9  F (36.6  C) Oral 107 18 --   20 0648 125/77 98.1  F (36.7  C) Oral 86 16 --   20 0103 111/73 98.1  F (36.7  C) Oral 86 16 96 %     Gen:  NAD  CV: regular rate and rhythm  Resp: CTAB, good air movement  Abd: soft, nondistended, nontender, fundus firm at 2cm below umbilicus  Incision:  clean, dry, intact with Steristrips  Ext: non-tender, trace BLE edema, no erythema    Hemoglobin   Date Value Ref Range Status   2020 10.4 (L) 11.7 - 15.7 g/dL Final   2020 11.8 11.7 - 15.7 g/dL Final       A/P: 35 year old  POD#1 s/p PLTCS done STAT for fetal bradycardia, doing well postpartum.    1. Heme: the patient had a preoperative Hgb of 11.8, an EBL of 605cc was associated with delivery.  POD1 Hgb is 10.4, appropriate mild ABLA.  Asx.  2. Pain control: pain is well-controlled with Tylenol, Motrin and oxycodone, continue.  TAP block discussed w/ anesthesia resident  3. Rh positive/Rubella immune  4. Incision: no concerns  5. Possible prior cHTN history, normotensive currently  6. Routine postpartum:    encourage breastfeeding    Encourage ambulation    Continue regular diet    Continue bowel regimen    Contraception: will discuss with outpatient provider  7. Dispo: likely discharge POD#2    Allison Dumas MD  PGY-4 OB/GYN  2020 9:19 AM     /68   Pulse 107   Temp 97.9  F (36.6  C) (Oral)   Resp 18   LMP 2019   SpO2 96%   Breastfeeding Unknown   Hemoglobin   Date Value Ref Range  Status   05/06/2020 10.4 (L) 11.7 - 15.7 g/dL Final     The patent was seen and examined by me separately from the team.  I have reviewed and agree with the above note.  She is overall doing well-c/o cough and post nasal drip 2/2 seasonal allergies.  Usually takes Zyrtec and Flonase-hasn't taken it in a few days.  Would like a TAP block today.  Continue routine post op care, reviewed possible discharge tomorrow.    Nathaly Troncoso MD, FACOG

## 2020-05-06 NOTE — PLAN OF CARE
"Vital sign stable and postpartum checks within normal range. Pt has some dry cough and stated, \" it is my allergies and I have not taken my medications for some days now\".  MD has now ordered her allergy medication. Pt is up in the chair and ambulating in the room with no problems. Pt is voiding and Passing flatus. Had a shower this afternoon. Incision is in tact with sterile strips in place. Breastfeeding well with minimal assist latching baby deeper/ flanging the lips. Nipples are red and tender. Pt has started pumping to help bring in her breast milk fast. Pt is using the hydrogel for comfort. Complains of generalized aching and incisional pain. Pt medicated with Ibuprofen, Tylenol and Roxicodone for pain control. Checked latch and flange baby's lip. Encouraged pt to pump and assisted with pumping. Continue cares and check the latch.   "

## 2020-05-07 VITALS
HEART RATE: 99 BPM | RESPIRATION RATE: 18 BRPM | DIASTOLIC BLOOD PRESSURE: 84 MMHG | OXYGEN SATURATION: 96 % | TEMPERATURE: 98.3 F | SYSTOLIC BLOOD PRESSURE: 128 MMHG

## 2020-05-07 PROCEDURE — 25000132 ZZH RX MED GY IP 250 OP 250 PS 637: Performed by: STUDENT IN AN ORGANIZED HEALTH CARE EDUCATION/TRAINING PROGRAM

## 2020-05-07 PROCEDURE — 25000132 ZZH RX MED GY IP 250 OP 250 PS 637: Performed by: OBSTETRICS & GYNECOLOGY

## 2020-05-07 RX ORDER — AMOXICILLIN 250 MG
1 CAPSULE ORAL DAILY
Qty: 100 TABLET | Refills: 0 | Status: SHIPPED | OUTPATIENT
Start: 2020-05-07

## 2020-05-07 RX ORDER — IBUPROFEN 600 MG/1
600 TABLET, FILM COATED ORAL EVERY 6 HOURS PRN
Qty: 60 TABLET | Refills: 0 | Status: SHIPPED | OUTPATIENT
Start: 2020-05-07

## 2020-05-07 RX ORDER — ACETAMINOPHEN 325 MG/1
650 TABLET ORAL EVERY 6 HOURS PRN
Qty: 100 TABLET | Refills: 0 | Status: SHIPPED | OUTPATIENT
Start: 2020-05-07

## 2020-05-07 RX ORDER — OXYCODONE HYDROCHLORIDE 5 MG/1
5 TABLET ORAL EVERY 6 HOURS PRN
Qty: 12 TABLET | Refills: 0 | Status: SHIPPED | OUTPATIENT
Start: 2020-05-07 | End: 2020-05-10

## 2020-05-07 RX ADMIN — OXYCODONE HYDROCHLORIDE 5 MG: 5 TABLET ORAL at 12:56

## 2020-05-07 RX ADMIN — CETIRIZINE HYDROCHLORIDE 5 MG: 5 TABLET ORAL at 12:57

## 2020-05-07 RX ADMIN — OXYCODONE HYDROCHLORIDE 5 MG: 5 TABLET ORAL at 08:17

## 2020-05-07 RX ADMIN — OXYCODONE HYDROCHLORIDE 5 MG: 5 TABLET ORAL at 02:48

## 2020-05-07 RX ADMIN — ACETAMINOPHEN 975 MG: 325 TABLET, FILM COATED ORAL at 02:48

## 2020-05-07 RX ADMIN — IBUPROFEN 800 MG: 800 TABLET, FILM COATED ORAL at 08:16

## 2020-05-07 RX ADMIN — SENNOSIDES AND DOCUSATE SODIUM 2 TABLET: 8.6; 5 TABLET ORAL at 08:17

## 2020-05-07 RX ADMIN — ACETAMINOPHEN 975 MG: 325 TABLET, FILM COATED ORAL at 14:29

## 2020-05-07 RX ADMIN — IBUPROFEN 800 MG: 800 TABLET, FILM COATED ORAL at 02:48

## 2020-05-07 RX ADMIN — IBUPROFEN 800 MG: 800 TABLET, FILM COATED ORAL at 14:29

## 2020-05-07 RX ADMIN — FLUTICASONE PROPIONATE 1 SPRAY: 50 SPRAY, METERED NASAL at 12:57

## 2020-05-07 RX ADMIN — ACETAMINOPHEN 975 MG: 325 TABLET, FILM COATED ORAL at 08:16

## 2020-05-07 NOTE — PROGRESS NOTES
[facility] Obstetrics Postpartum Progress Note    S: pt doing well this morning, up walking around room. She is tolerating PO without  N/v, passing flatus, no BM yet. She is voiding without issue. She is able to ambulate with lightheadedness or dizziness. Ready for discharge today  O:  Patient Vitals for the past 12 hrs:   BP Temp Temp src Heart Rate Resp   20 2355 123/74 97.9  F (36.6  C) Oral 73 18     Gen:  NAD  CV: regular rate and rhythm  Resp: CTAB, good air movement  Abd: soft, nondistended, nontender, did not exam fundus as patient walking  Ext: non-tender, trace BLE edema, no erythema    Hemoglobin   Date Value Ref Range Status   2020 10.4 (L) 11.7 - 15.7 g/dL Final   2020 11.8 11.7 - 15.7 g/dL Final       A/P: 35 year old  POD#2 s/p PLTCS done STAT for fetal bradycardia, doing well postpartum.    1. Heme: the patient had a preoperative Hgb of 11.8, an EBL of 605cc was associated with delivery.  POD1 Hgb is 10.4.  2. Pain control: pain is well-controlled with Tylenol, Motrin and oxycodone, continue.  TAP block discussed w/ anesthesia resident  3. Rh positive/Rubella immune  4. Incision: no concerns  5. Possible prior cHTN history, normotensive currently and through stay  6. Routine postpartum:    encourage breastfeeding    Encourage ambulation    Continue regular diet    Continue bowel regimen    Contraception: will discuss with outpatient provider  7. Dispo: likely discharge today    Lulu Mclean MD  Obstetrics and Gynecology, PGY-3  May 7, 2020 , 8:01 AM      I have seen and examined the patient without the resident. I have reviewed, edited, and agree with the note.   My findings are:My findings are: Appears well. Eager to discharge home.  Abdomen: soft, NT, ND.   Incision CDI   LE without significant edema or erythema bilaterally  Hemoglobin   Date Value Ref Range Status   2020 10.4 (L) 11.7 - 15.7 g/dL Final   2020 11.8 11.7 - 15.7 g/dL Final     Discharge  home  Aurelia Mues MD

## 2020-05-07 NOTE — PLAN OF CARE
Data: Vital signs within normal limits. + flatus. Abdominal binder in place. Postpartum checks within normal limits - see flow record. Patient eating and drinking normally. Patient able to empty bladder independently and is up ambulating. No apparent signs of infection. Incision healing well. Patient performing self cares and is able to care for infant. Breastfeeding with assist; mothers nipples are very tender and using hydrogels for comfort. Supplementing with donormilk post feeds.   Action: Patient medicated during the shift for pain and cramping. See MAR. Patient reassessed within 1 hour after each medication and pain was improved - patient stated she was comfortable. Patient education done about breastfeeding; pumping/HE; and supplementation of . [place] care questions answered. See flow record.  Response: Positive attachment behaviors observed with infant. Support persons Brad present.   Plan: Anticipate discharge today.

## 2020-05-07 NOTE — PLAN OF CARE
Patient's vss, postpartum cares WDL. Discharge instructions reviewed with patient, home medications given to patient. Patient acknowledged follow up plan in 6 weeks. Patient discharged.

## 2020-05-14 ENCOUNTER — TELEPHONE (OUTPATIENT)
Dept: ADMINISTRATIVE | Facility: OTHER | Age: 35
End: 2020-05-14

## 2020-05-14 NOTE — TELEPHONE ENCOUNTER
Contacted patient to let know of possible exposure to someone with COVID-19.  Gave situation, background, assessment and recommendation per Infection Prevention guidelines for patient exposure.  Patient verbalized understanding.  Patient tearful and concerned about her  having been exposed.  Educated pt to call  provider to discuss any recommendations they would have for .  Pt also expressed concern for her mother's health as her mom was with her and baby, educated pt to stay vigilant on looking for the symptoms with herself and her mom could also look for those symptoms, and to call primary care providers with any further questions, concerns or symptoms that arise, pt verbalized understanding.  Crys Dumas RN   [facility]/working in Infection Prevention role   [phone]  [email or web address]

## 2020-11-29 ENCOUNTER — HEALTH MAINTENANCE LETTER (OUTPATIENT)
Age: 35
End: 2020-11-29

## 2021-09-25 ENCOUNTER — HEALTH MAINTENANCE LETTER (OUTPATIENT)
Age: 36
End: 2021-09-25

## 2022-01-15 ENCOUNTER — HEALTH MAINTENANCE LETTER (OUTPATIENT)
Age: 37
End: 2022-01-15

## 2022-06-19 ENCOUNTER — OFFICE VISIT (OUTPATIENT)
Dept: URGENT CARE | Facility: URGENT CARE | Age: 37
End: 2022-06-19
Payer: COMMERCIAL

## 2022-06-19 VITALS
WEIGHT: 193 LBS | OXYGEN SATURATION: 96 % | BODY MASS INDEX: 37.69 KG/M2 | DIASTOLIC BLOOD PRESSURE: 81 MMHG | SYSTOLIC BLOOD PRESSURE: 129 MMHG | HEART RATE: 96 BPM | TEMPERATURE: 99.7 F

## 2022-06-19 DIAGNOSIS — J37.1: Primary | ICD-10-CM

## 2022-06-19 PROCEDURE — 99203 OFFICE O/P NEW LOW 30 MIN: CPT | Performed by: INTERNAL MEDICINE

## 2022-06-19 RX ORDER — CODEINE PHOSPHATE AND GUAIFENESIN 10; 100 MG/5ML; MG/5ML
1-2 SOLUTION ORAL EVERY 4 HOURS PRN
Qty: 180 ML | Refills: 0 | Status: SHIPPED | OUTPATIENT
Start: 2022-06-19

## 2022-06-19 RX ORDER — PREDNISONE 20 MG/1
40 TABLET ORAL DAILY
Qty: 8 TABLET | Refills: 0 | Status: SHIPPED | OUTPATIENT
Start: 2022-06-19 | End: 2022-06-23

## 2022-06-19 RX ORDER — FLUTICASONE PROPIONATE 50 MCG
1 SPRAY, SUSPENSION (ML) NASAL DAILY
Qty: 11 ML | Refills: 1 | Status: SHIPPED | OUTPATIENT
Start: 2022-06-19

## 2022-06-19 NOTE — PROGRESS NOTES
Assessment & Plan     Chronic laryngitis and laryngotracheitis  Discussed pregnancy implications.  See patient instructions.  - fluticasone (FLONASE) 50 MCG/ACT nasal spray; Spray 1 spray into both nostrils daily  - predniSONE (DELTASONE) 20 MG tablet; Take 2 tablets (40 mg) by mouth daily for 4 days  - guaiFENesin-codeine (ROBITUSSIN AC) 100-10 MG/5ML solution; Take 5-10 mLs by mouth every 4 hours as needed for cough    Jose Francisco Peres MD  [facility] URGENT [facility] [place]    Magalie Gordon is a 37 year old, presenting for the following health issues:  Urgent Care (Pt in clinic to have eval for SOB, cough and wheezing.)      HPI   Chief complaint of cough.  Illness began a few weeks ago.  Negative COVID on multiple occasions.  Cough now worsening.  Has lost her voice.  Cough is bronchospastic.  Has a history of mild asthma.  States that her albuterol inhaler isn't helping and is also on Pulmicort now.  She is currently at 27 weeks of pregnancy.  Denies sinus pain, pressure or drainage.  Still some rhinorrhea and cough is productive in the AM.      Review of Systems   Constitutional, HEENT, cardiovascular, pulmonary, gi and gu systems are negative, except as otherwise noted.      Objective    /81   Pulse 96   Temp 99.7  F (37.6  C) (Temporal)   Wt 87.5 kg (193 lb)   SpO2 96%   BMI 37.69 kg/m    Body mass index is 37.69 kg/m .  Physical Exam   GENERAL APPEARANCE: healthy, alert and no distress  HENT: ear canals and TM's normal and cobblestoning of the posterior pharynx with post-nasal drainage   NECK: no adenopathy, no asymmetry, masses, or scars and thyroid normal to palpation  RESP: lungs clear to auscultation - no rales, rhonchi or wheezes  CV: regular rates and rhythm, normal S1 S2, no S3 or S4 and no murmur, click or rub    No results found for this or any previous visit (from the past 24 hour(s)).              .  ..

## 2022-06-19 NOTE — PATIENT INSTRUCTIONS
The oral steroid and the codeine-containing cough suppressant both can cross the placenta and have influences on the baby.  These are not issues with short-term use but can become problematic with longer-term use.  Our plan right now is to just use these for the next 4 days to get things settled down and then have you continue on the fluticasone nasal steroid spray to keep things settled down.

## 2022-12-26 ENCOUNTER — HEALTH MAINTENANCE LETTER (OUTPATIENT)
Age: 37
End: 2022-12-26

## 2023-04-16 ENCOUNTER — HEALTH MAINTENANCE LETTER (OUTPATIENT)
Age: 38
End: 2023-04-16

## 2023-05-19 ENCOUNTER — LAB REQUISITION (OUTPATIENT)
Dept: LAB | Facility: CLINIC | Age: 38
End: 2023-05-19

## 2023-05-19 PROCEDURE — 88263 CHROMOSOME ANALYSIS 45: CPT

## 2023-05-19 PROCEDURE — 88263 CHROMOSOME ANALYSIS 45: CPT | Performed by: MEDICAL GENETICS

## 2023-06-16 LAB
CULTURE HARVEST COMPLETE DATE: NORMAL

## 2023-06-19 LAB
INTERPRETATION: NORMAL
ISCN: NORMAL
METHODS: NORMAL

## 2024-02-25 ENCOUNTER — OFFICE VISIT (OUTPATIENT)
Dept: URGENT CARE | Facility: URGENT CARE | Age: 39
End: 2024-02-25
Payer: COMMERCIAL

## 2024-02-25 VITALS
TEMPERATURE: 97.9 F | RESPIRATION RATE: 20 BRPM | OXYGEN SATURATION: 98 % | DIASTOLIC BLOOD PRESSURE: 97 MMHG | BODY MASS INDEX: 37.11 KG/M2 | SYSTOLIC BLOOD PRESSURE: 145 MMHG | HEART RATE: 109 BPM | WEIGHT: 190 LBS

## 2024-02-25 DIAGNOSIS — R07.0 THROAT PAIN: Primary | ICD-10-CM

## 2024-02-25 DIAGNOSIS — J03.90 TONSILLITIS: ICD-10-CM

## 2024-02-25 LAB — DEPRECATED S PYO AG THROAT QL EIA: NEGATIVE

## 2024-02-25 PROCEDURE — 99213 OFFICE O/P EST LOW 20 MIN: CPT | Performed by: FAMILY MEDICINE

## 2024-02-25 PROCEDURE — 87651 STREP A DNA AMP PROBE: CPT | Performed by: FAMILY MEDICINE

## 2024-02-25 RX ORDER — CEFDINIR 300 MG/1
300 CAPSULE ORAL 2 TIMES DAILY
Qty: 20 CAPSULE | Refills: 0 | Status: SHIPPED | OUTPATIENT
Start: 2024-02-25 | End: 2024-03-06

## 2024-02-26 LAB — GROUP A STREP BY PCR: NOT DETECTED

## 2024-02-26 NOTE — PROGRESS NOTES
SUBJECTIVE: Heidi Alexander is a 39 year old female presenting with a chief complaint of sore throat.  Onset of symptoms was day(s) ago.    Past Medical History:   Diagnosis Date    Polycystic ovarian syndrome      Allergies   Allergen Reactions    Gluten Meal Diarrhea    Adhesive Tape Rash     Social History     Tobacco Use    Smoking status: Never    Smokeless tobacco: Never   Substance Use Topics    Alcohol use: Not Currently       ROS:  SKIN: no rash  GI: no vomiting    OBJECTIVE:  BP (!) 145/97   Pulse 109   Temp 97.9  F (36.6  C)   Resp 20   Wt 86.2 kg (190 lb)   SpO2 98%   BMI 37.11 kg/m  GENERAL APPEARANCE: healthy, alert and no distress  EYES: EOMI,  PERRL, conjunctiva clear  HENT: ear canals and TM's normal.  Nose and mouth without ulcers, erythema or lesions  RESP: lungs clear to auscultation - no rales, rhonchi or wheezes  SKIN: no suspicious lesions or rashes      ICD-10-CM    1. Throat pain  R07.0 Streptococcus A Rapid Screen w/Reflex to PCR - Clinic Collect     Group A Streptococcus PCR Throat Swab      2. Tonsillitis  J03.90 cefdinir (OMNICEF) 300 MG capsule        Fluids/Rest, f/u if worse/not any better

## 2024-06-23 ENCOUNTER — HEALTH MAINTENANCE LETTER (OUTPATIENT)
Age: 39
End: 2024-06-23

## 2025-06-17 NOTE — PROGRESS NOTES
Addended by: TORRES DESAI on: 6/17/2025 06:07 PM     Modules accepted: Orders     Post  Anesthesia Follow Up Note    Patient: Heidi Alexander    Patient location: Postpartum floor.    Chief complaint: Acute postoperative pain management s/p general anesthesia for  delivery    Procedure(s) Performed:  Procedure(s):   SECTION    Anesthesia type: General    Subjective:     Pain Control: She has diffuse moderate whole body aches due to days of pushing - she denies specific incisional pain.    Additional ROS:  She does not complain of difficulties breathing or voiding, denies nausea or vomiting. She is able to ambulate and tolerates regular diet. She does have a moderate sore throat.     Objective:    Respiratory Function (RR / SpO2 / Airway Patency): Satisfactory    Cardiac Function (HR / Rhythm / BP): Satisfactory    Last Vitals: /69   Pulse 89   Temp 36.4  C (97.5  F) (Axillary)   Resp 16   LMP 2019   SpO2 96%   Breastfeeding Unknown     Assessment and plan:   Heidi Alexander is a 35 year old female  POD #1 s/p   with general anesthesia. She did not receive a TAP block but after discussing the options with her we came to a decision that there would likely be minimal additional benefit from a TAP block at this time. Pt is ambulating without difficulty.  There is no evidence of adverse side effects associated with general anesthesia. The patient is receiving adequate incisional pain control at this time.      In brief summary, her post-operative analgesia is adequate today. Further interventional analgesic strategies would be of little utility at this time. Thus, we recommend proceeding with PO analgesics including staggered dosing of NSAIDs (ibuprofen) and acetaminophen, with a taper of oxycodone.     Thank you for including us in the care for this patient.    Gita HACKETT  Anesthesia Resident, PGY2

## 2025-07-12 ENCOUNTER — HEALTH MAINTENANCE LETTER (OUTPATIENT)
Age: 40
End: 2025-07-12

## (undated) DEVICE — GLOVE ESTEEM POWDER FREE SMT 7.0  2D72PT70

## (undated) DEVICE — GLOVE PROTEXIS BLUE W/NEU-THERA 7.5  2D73EB75

## (undated) DEVICE — SU VICRYL 0 CT-1 36" J346H

## (undated) DEVICE — SUCTION CANISTER MEDIVAC LINER 1500ML W/LID 65651-515

## (undated) DEVICE — STRAP KNEE/BODY 31143004

## (undated) DEVICE — ESU GROUND PAD UNIVERSAL W/O CORD

## (undated) DEVICE — SOL NACL 0.9% IRRIG 1000ML BOTTLE 07138-09

## (undated) DEVICE — SOL WATER IRRIG 1000ML BOTTLE 07139-09

## (undated) DEVICE — BARRIER SEPRAFILM 5X6" SINGLE SHEET 4301-02

## (undated) DEVICE — BNDG ABDOMINAL BINDER 10X26-50" 08140145

## (undated) DEVICE — STOCKING SLEEVE COMPRESSION CALF LG

## (undated) DEVICE — PACK C-SECTION LF PL15OTA83B

## (undated) DEVICE — PREP CHLORAPREP 26ML TINTED ORANGE  260815

## (undated) DEVICE — SU VICRYL 3-0 CTX 36" UND J980H

## (undated) DEVICE — DRAPE SETUP C-SECTION INVISISHIELD 54X90" DYNJE5600

## (undated) DEVICE — STPL SKIN 35W 059037

## (undated) DEVICE — BASIN SET MAJOR

## (undated) DEVICE — CATH TRAY FOLEY 16FR BARDEX W/DRAIN BAG STATLOCK 300316A

## (undated) DEVICE — SU MONOCRYL 0 CTB-1 36" YB946

## (undated) RX ORDER — OXYTOCIN/0.9 % SODIUM CHLORIDE 30/500 ML
PLASTIC BAG, INJECTION (ML) INTRAVENOUS
Status: DISPENSED
Start: 2020-05-05

## (undated) RX ORDER — PHENYLEPHRINE HCL IN 0.9% NACL 1 MG/10 ML
SYRINGE (ML) INTRAVENOUS
Status: DISPENSED
Start: 2020-05-05

## (undated) RX ORDER — FENTANYL CITRATE 50 UG/ML
INJECTION, SOLUTION INTRAMUSCULAR; INTRAVENOUS
Status: DISPENSED
Start: 2020-05-05

## (undated) RX ORDER — ONDANSETRON 2 MG/ML
INJECTION INTRAMUSCULAR; INTRAVENOUS
Status: DISPENSED
Start: 2020-05-05

## (undated) RX ORDER — EPHEDRINE SULFATE 50 MG/ML
INJECTION, SOLUTION INTRAMUSCULAR; INTRAVENOUS; SUBCUTANEOUS
Status: DISPENSED
Start: 2020-05-05

## (undated) RX ORDER — HYDROMORPHONE HYDROCHLORIDE 1 MG/ML
INJECTION, SOLUTION INTRAMUSCULAR; INTRAVENOUS; SUBCUTANEOUS
Status: DISPENSED
Start: 2020-05-05

## (undated) RX ORDER — KETOROLAC TROMETHAMINE 30 MG/ML
INJECTION, SOLUTION INTRAMUSCULAR; INTRAVENOUS
Status: DISPENSED
Start: 2020-05-05

## (undated) RX ORDER — ACETAMINOPHEN 325 MG/1
TABLET ORAL
Status: DISPENSED
Start: 2020-05-05